# Patient Record
Sex: FEMALE | Race: WHITE | NOT HISPANIC OR LATINO | Employment: STUDENT | URBAN - NONMETROPOLITAN AREA
[De-identification: names, ages, dates, MRNs, and addresses within clinical notes are randomized per-mention and may not be internally consistent; named-entity substitution may affect disease eponyms.]

---
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
## 2018-01-04 NOTE — NURSING NOTE
Patient Information     Patient Name MRN Sex Mary Ann Huddleston [id number] Female 2000      Nursing Note by Sobia Patino at 2017  9:15 AM     Author:  Sobia Patino Service:  (none) Author Type:  (none)     Filed:  2017  9:38 AM Encounter Date:  2017 Status:  Signed     :  Sobia Patino            Patient presents with teeth/Gum concerns.  Sobia Patino LPN .........................2017  9:07 AM

## 2018-01-04 NOTE — PROGRESS NOTES
Patient Information     Patient Name MRN Sex Mary Ann Mosher [id number] Female 2000      Progress Notes by Ruba Roe MD at 2017  9:15 AM     Author:  Ruba Roe MD Service:  (none) Author Type:  Physician     Filed:  2017  5:27 PM Encounter Date:  2017 Status:  Signed     :  Ruba Roe MD (Physician)            Nursing Notes:   Sobia Patino  2017  9:38 AM  Signed  Patient presents with teeth/Gum concerns.  Sobia Patino LPN .........................2017  9:07 AM      HPI:  Mary Ann Spring is a 16 y.o. female who presents with mother for evaluation of some sore lumps in the soft palate that have been noted over the past 6 weeks. She reports the lumps seem to be in the soft tissue and has not had any sores or redness on the surface of the palate. She reports the right side tends to be larger but does change in size. Mary Ann had her wisdom teeth removed 3 years ago, no recent dental issues or pain. She has not had any cough or cold symptoms lately but did have mono in December this winter. She has been afebrile, slight congestion but no sneezing.        ROS:  see HPI. Otherwise negative.    No current outpatient prescriptions on file.     No current facility-administered medications for this visit.      Medications have been reviewed by me and are current to the best of my knowledge and ability.    Review of patient's allergies indicates no known allergies.    Past Medical History:     Diagnosis  Date     Hx of being hospitalized      15 months for RSV bronchiolitis.      UTI (lower urinary tract infection)        Family History       Problem   Relation Age of Onset     Other  Brother      Cytomegalovirus encephalopathy         Social History     Social History        Marital status:  Single     Spouse name: N/A     Number of children:  N/A     Years of education:  N/A     Social History Main Topics       Smoking status:  "Never Smoker     Smokeless tobacco: None     Alcohol use None     Drug use: None     Sexual activity: Not Asked     Other Topics  Concern     None      Social History Narrative     Lives with  parents and 2 siblings in [place].      Mother is an internal medicine nurse practitioner at [employer].  Youngest sibling,        Preload  11/22/2013         PE:  /80  Pulse 70  Temp 98.6  F (37  C) (Tympanic)  Ht 1.588 m (5' 2.5\")  Wt 64.4 kg (142 lb)  BMI 25.56 kg/m2  General appearance: Alert, well nourished, in no distress.  Ear Exam: Canals and TMs clear bilaterally.  Nose Exam: normal mucosa.  OroPharynx Exam: no erythema, edema, or exudates. No obvious sores or redness of soft palate, small symmetrical palpable lumps on the lateral aspect of the posterior palate in the soft tissue, slightly tender to palpation  Neck Exam: neck supple with no masses or adenopathy.  Cardiovascular Exam: RRR without mumurs, clicks or gallops.  Lung Exam:: Clear to auscultation, no wheezing, crackles or rhonchi.  No increased work of breathing.      Assessment:     ICD-10-CM    1. Pharynx or nasopharynx edema J39.2 triamcinolone, 55 mcg each actuation, nasal (NASACORT AQ) 55 mcg nasal spray         Plan:  Palpable lumps in the soft palate are symmetric and etiology is unclear. This may represent normal anatomy of either the superior aspect of the tonsil or floor of the maxillary sinus. We opted to try on some nasal steroid to see if this would improve symptoms if sinus related as symptoms did begin around the time of snow melt and may be allergen related. If still present or worsening in the next month or so then will need to discuss further imaging with CT and referral to ENT.     Ruba Roe MD ....................  4/18/2017   5:26 PM                     "

## 2018-01-26 VITALS
DIASTOLIC BLOOD PRESSURE: 80 MMHG | SYSTOLIC BLOOD PRESSURE: 110 MMHG | TEMPERATURE: 98.6 F | WEIGHT: 142 LBS | BODY MASS INDEX: 25.16 KG/M2 | HEART RATE: 70 BPM | HEIGHT: 63 IN

## 2018-01-26 VITALS — DIASTOLIC BLOOD PRESSURE: 64 MMHG | WEIGHT: 135 LBS | SYSTOLIC BLOOD PRESSURE: 107 MMHG | HEART RATE: 72 BPM

## 2018-02-01 ENCOUNTER — DOCUMENTATION ONLY (OUTPATIENT)
Dept: FAMILY MEDICINE | Facility: OTHER | Age: 18
End: 2018-02-01

## 2018-02-01 RX ORDER — TRIAMCINOLONE ACETONIDE 55 UG/1
1 SPRAY, METERED NASAL DAILY
COMMUNITY
Start: 2017-04-18 | End: 2018-07-16

## 2018-02-01 RX ORDER — CHLOROQUINE PHOSPHATE 500 MG/1
TABLET, COATED ORAL
COMMUNITY
Start: 2017-06-02 | End: 2018-07-16

## 2018-03-26 ENCOUNTER — HEALTH MAINTENANCE LETTER (OUTPATIENT)
Age: 18
End: 2018-03-26

## 2018-07-16 ENCOUNTER — OFFICE VISIT (OUTPATIENT)
Dept: FAMILY MEDICINE | Facility: OTHER | Age: 18
End: 2018-07-16
Attending: FAMILY MEDICINE
Payer: COMMERCIAL

## 2018-07-16 VITALS
SYSTOLIC BLOOD PRESSURE: 102 MMHG | BODY MASS INDEX: 25.94 KG/M2 | WEIGHT: 146.4 LBS | DIASTOLIC BLOOD PRESSURE: 70 MMHG | HEIGHT: 63 IN | HEART RATE: 60 BPM

## 2018-07-16 DIAGNOSIS — Z00.00 PHYSICAL EXAM, ANNUAL: Primary | ICD-10-CM

## 2018-07-16 DIAGNOSIS — Z30.011 ENCOUNTER FOR INITIAL PRESCRIPTION OF CONTRACEPTIVE PILLS: ICD-10-CM

## 2018-07-16 PROCEDURE — 99395 PREV VISIT EST AGE 18-39: CPT | Performed by: FAMILY MEDICINE

## 2018-07-16 RX ORDER — FEXOFENADINE HCL 180 MG/1
180 TABLET ORAL DAILY
Qty: 30 TABLET | Refills: 1 | COMMUNITY
Start: 2018-07-16

## 2018-07-16 ASSESSMENT — PAIN SCALES - GENERAL: PAINLEVEL: NO PAIN (0)

## 2018-07-16 NOTE — PROGRESS NOTES
SUBJECTIVE:    Mary Ann Spring is a 17 year old female who presents for her annual exam.    HPI: Mary Ann Spring is a 17 year old female presents for her annual exam.    Last pap: NA  Immunizations:  Up to date   Mammogram at age 43  Colon cancer screening at age 48  Current birth control none, she would like to discuss this today.  She is in a long-term relationship.  She has not ever been sexually active.          PROBLEM LIST:  There is no problem list on file for this patient.      PAST MEDICAL HISTORY:  Past Medical History:   Diagnosis Date     Infectious mononucleosis without complication     12/2016     Personal history of other medical treatment (CODE)     15 months for RSV bronchiolitis.     Urinary tract infection     02/07     SURGICAL HISTORY:  Past Surgical History:   Procedure Laterality Date     OTHER SURGICAL HISTORY      90528.0,PAST SURGICAL HISTORY,none       SOCIAL HISTORY:  Social History     Social History     Marital status: Single     Spouse name: N/A     Number of children: N/A     Years of education: N/A     Occupational History     Not on file.     Social History Main Topics     Smoking status: Never Smoker     Smokeless tobacco: Never Used     Alcohol use Not on file     Drug use: Not on file     Sexual activity: Not on file     Other Topics Concern     Not on file     Social History Narrative    Lives with  parents and 2 siblings in [place].    Mother is an internal medicine nurse practitioner at [employer].  Youngest sibling,  Will attend  [employer], Fall 2017, freshman     FAMILY HISTORY:    Family History   Problem Relation Age of Onset     Other - See Comments Brother      Cytomegalovirus encephalopathy       CURRENT MEDICATIONS:   Current Outpatient Prescriptions   Medication Sig Dispense Refill     fexofenadine (ALLEGRA) 180 MG tablet Take 1 tablet (180 mg) by mouth daily 30 tablet 1     ALLERGIES:   No Known Allergies      REVIEW OF SYSTEMS:  General: denies any  "general problems.  Eyes: denies problems; glasses for reading, has eye appointment coming up    Ears/Nose/Throat: denies problems, last dentist visit was last Friday   Respiratory: denies problems  Cardiovascular: denies problems  Gastrointestinal: denies problems  Genitourinary: LMP - June 28th, 28-36 day cycles, no significant pain/cramping  No previous birth control use.    Musculoskeletal: does long distance running - no significant running injuries  Skin: denies problems  Neurologic: denies problems - no headaches   Psychiatric: denies problems  Endocrine: denies problems  Heme/Lymphatic: denies problems  Allergic/Immunologic: deniesproblems    PHQ-2 Score:     PHQ-2 ( 1999 Pfizer) 7/16/2018   Q1: Little interest or pleasure in doing things 0   Q2: Feeling down, depressed or hopeless 0   PHQ-2 Score 0         OBJECTIVE:  /70 (BP Location: Right arm, Patient Position: Sitting, Cuff Size: Adult Regular)  Pulse 60  Ht 5' 3\" (1.6 m)  Wt 146 lb 6.4 oz (66.4 kg)  LMP 06/28/2018  Breastfeeding? No  BMI 25.93 kg/m2   EXAM:   General Appearance: Pleasant, alert, appropriate appearance for age. No acute distress  Head Exam: Normal. Normocephalic, atraumatic.  Eye Exam:Normal external eye, conjunctiva, lids, cornea. RODNEY.  Ear Exam: Normal TM's bilaterally. Normal auditory canals and external ears. Non-tender.  Nose Exam: Normal external nose, mucus membranes, and septum.  OroPharynx Exam:  Dental hygiene adequate. Normal buccal mucose. Normal pharynx.  Neck Exam:  Supple, no masses or nodes.  Thyroid Exam: No nodules or enlargement.  Chest/Respiratory Exam: Normal chest wall andrespirations. Clear to auscultation.  Cardiovascular Exam: Regular rate and rhythm. S1, S2, no murmur, click, gallop, or rubs.  Gastrointestinal Exam: Soft, non-tender, no masses or organomegaly  Musculoskeletal Exam: Back is straight and non-tender, full ROM of upper and lower extremities.  Foot Exam: Left andright foot: good pedal " pulses, no lesions, nail hygiene good.  Skin: no rash or abnormalities  Neurologic Exam: Nonfocal, symmetric DTRs, normal gross motor, tone coordination and no tremor.  Psychiatric Exam: Alertand oriented - appropriate affect.    Office Visit - GI on 12/12/2016   Component Date Value Ref Range Status     Heterophile - Historical 12/12/2016 Negative  Negative Final     AST (SGOT) - Historical 12/12/2016 35  13 - 39 IU/L Final     Sodium 12/12/2016 136  133 - 143 mmol/L Final     Urea Nitrogen 12/12/2016 9  7 - 25 mg/dL Final     Protein Total 12/12/2016 7.9  6.4 - 8.9 g/dL Final     Bilirubin Total 12/12/2016 0.4  0.3 - 1.0 mg/dL Final     A/G Ratio - Historical 12/12/2016 1.2  1.0 - 2.0 Final     Glucose 12/12/2016 89  70 - 105 mg/dL Final     Potassium 12/12/2016 4.4  3.5 - 5.1 mmol/L Final     BUN/Creatinine Ratio - Historical 12/12/2016 15   Final     GFR Estimate If Black 12/12/2016 >60  >60 ml/min/1.73m2 Final     Albumin 12/12/2016 4.3  3.5 - 5.7 g/dL Final     Chloride 12/12/2016 102  98 - 107 mmol/L Final     Carbon Dioxide 12/12/2016 24  21 - 31 mmol/L Final     Creatinine 12/12/2016 0.61* 0.70 - 1.30 mg/dL Final     GFR If Not  - Hist* 12/12/2016 >60  >60 ml/min/1.73m2 Final     Globulin - Historical 12/12/2016 3.6  2.0 - 3.7 g/dL Final     ALT (SGPT) - Historical 12/12/2016 43  7 - 52 IU/L Final     Anion Gap - Historical 12/12/2016 10  5 - 18 Final     Calcium 12/12/2016 8.9  8.6 - 10.3 mg/dL Final     Alkaline Phosphatase 12/12/2016 70  34 - 104 IU/L Final     EBV IgM - Historical 12/14/2016 Positive* Negative Final     EBNA IgG - Historical 12/14/2016 Positive* Negative Final     VCA IgG - Historical 12/14/2016 Positive* Negative Final     EBV Interpretation - Historical 12/14/2016 Suggests Convalescence   Final     White Blood Count - Historical 12/12/2016 7.6  4.5 - 13.0 thou/cu mm Final     BASO 12/12/2016 1.1  <3.0 % Final     Hematocrit 12/12/2016 37.3  33.0 - 51.0 % Final      Absolute Lymphocytes - Historical 12/12/2016 3.2  1.2 - 6.5 thou/cu mm Final     MCHC 12/12/2016 31.7* 32.0 - 36.0 g/dL Final     Platelet Count 12/12/2016 252  140 - 440 thou/cu mm Final     % Eosinophils 12/12/2016 1.3  <8.0 % Final     Absolute Monocytes - Historical 12/12/2016 0.6  <0.9 thou/cu mm Final     Absolute Eosinophils - Historical 12/12/2016 0.1  <0.5 thou/cu mm Final     Hemoglobin 12/12/2016 11.8* 12.0 - 16.0 g/dL Final     MCH 12/12/2016 26.1  25.0 - 35.0 pg Final     Absolute Basophils - Historical 12/12/2016 0.1  <0.3 thou/cu mm Final     MCV 12/12/2016 82  78 - 102 fL Final     % Neutrophils 12/12/2016 47.7  33.0 - 64.0 % Final     Red Blood Count - Historical 12/12/2016 4.52  4.10 - 5.10 mil/cu mm Final     RDW 12/12/2016 13.8  11.5 - 15.5 % Final     MPV 12/12/2016 6.9  6.5 - 11.0 fL Final     Absolute Neutrophils - Historical 12/12/2016 3.6  1.5 - 8.0 thou/cu mm Final     % Lymphocytes 12/12/2016 42.0  25.0 - 48.0 % Final     % Monocytes 12/12/2016 8.0  <12.0 % Final       ASSESSMENT/PLAN    ICD-10-CM    1. Physical exam, annual Z00.00    2. Encounter for initial prescription of contraceptive pills Z30.011 norgestrel-ethinyl estradiol (LO/OVRAL) 0.3-30 MG-MCG per tablet     1.  STD testing, other labs not indicated today.  2.  Immunizations are up-to-date.  3.  We discussed contraceptive options including oral contraceptives, Depo-Provera, patch, pill, IUD, Norplant.  Following this discussion, she wishes to proceed with oral contraceptives.  She would like to do standard rather than continuous dosing..  She does not have any contraindications to use.  Potential side effects including nausea.,  Headaches, breast tenderness, irregular bleeding are discussed.  We also discussed concerning side effects such as hypertension and V TE.  Prescription for lo-Ovral 1 tablet daily to start on her next period.        Hailey Abebe MD

## 2018-07-16 NOTE — MR AVS SNAPSHOT
"              After Visit Summary   7/16/2018    Mary Ann Spring    MRN: [id number]           Patient Information     Date Of Birth          2000        Visit Information        Provider Department      7/16/2018 3:00 PM Hailey Bridges MD [facility]        Today's Diagnoses     Physical exam, annual    -  1    Encounter for initial prescription of contraceptive pills          Care Instructions    Start you birth control pills the first day of your next period.            Follow-ups after your visit        Your next 10 appointments already scheduled     Jul 20, 2018 11:00 AM CDT   DEONNA Chiropractor with Debra Spann DC   [facility] ([facility])    [address] [place] [id number] [phone]              Who to contact     If you have questions or need follow up information about today's clinic visit or your schedule please contact [facility] directly at [phone].  Normal or non-critical lab and imaging results will be communicated to you by [employer], letter or phone within 4 business days after the clinic has received the results. If you do not hear from us within 7 days, please contact the clinic through [employer] or phone. If you have a critical or abnormal lab result, we will notify you by phone as soon as possible.  Submit refill requests through [employer] or call your pharmacy and they will forward the refill request to us. Please allow 3 business days for your refill to be completed.          Additional Information About Your Visit        [employer] Information     [employer] lets you send messages to your doctor, view your test results, renew your prescriptions, schedule appointments and more. To sign up, go to [email or web address] . Click on \"Log in\" on the left side of the screen, which will take you to the Welcome page. Then click on \"Sign up Now\" on the right side of the page. " "    You will be asked to enter the access code listed below, as well as some personal information. Please follow the directions to create your username and password.     Your access code is: [email or web address]  Expires: 10/16/2018  5:22 PM     Your access code will  in 90 days. If you need help or a new code, please call your [facility] clinic or [phone].        Care EveryWhere ID     This is your Care EveryWhere ID. This could be used by other organizations to access your [facility] medical records  [id number]        Your Vitals Were     Pulse Height Last Period Breastfeeding? BMI (Body Mass Index)       60 5' 3\" (1.6 m) 2018 No 25.93 kg/m2        Blood Pressure from Last 3 Encounters:   18 102/70   17 107/64   17 110/80    Weight from Last 3 Encounters:   18 146 lb 6.4 oz (66.4 kg) (77 %)*   17 135 lb (61.2 kg) (67 %)*   17 142 lb (64.4 kg) (76 %)*     * Growth percentiles are based on [facility] 2-20 Years data.              Today, you had the following     No orders found for display         Today's Medication Changes          These changes are accurate as of 18 11:59 PM.  If you have any questions, ask your nurse or doctor.               Start taking these medicines.        Dose/Directions    norgestrel-ethinyl estradiol 0.3-30 MG-MCG per tablet   Commonly known as:  LO/OVRAL   Used for:  Encounter for initial prescription of contraceptive pills   Started by:  Hailey Bridges MD        Dose:  1 tablet   Take 1 tablet by mouth daily   Quantity:  84 tablet   Refills:  3         Stop taking these medicines if you haven't already. Please contact your care team if you have questions.     chloroquine 500 MG tablet   Commonly known as:  ARALEN   Stopped by:  Hailey Bridges MD           triamcinolone 55 MCG/ACT Inhaler   Commonly known as:  NASACORT   Stopped by:  Hailey Bridges MD                Where to get your medicines      These " medications were sent to [facility], - [place], [place] - [address]  [address], [place] [address]     Phone:  [phone]     norgestrel-ethinyl estradiol 0.3-30 MG-MCG per tablet                Primary Care Provider Office Phone # Fax #    Hailey SERRANO Pola Sam [phone]       [address]  [place] [address]        Equal Access to Services     [facility] Hospital[facility]IGEL : Hadii aad ku hadasho Soomaali, waaxda luqadaha, qaybta kaalmada adeegyada, waxay laureano hayyessica barnes . So [facility] [phone].    ATENCIÓN: Si habla español, tiene a holland disposición servicios gratuitos de asistencia lingüística. LlUniversity [facility] [phone].    We comply with applicable federal civil rights laws and [place] laws. We do not discriminate on the basis of race, color, national origin, age, disability, sex, sexual orientation, or gender identity.            Thank you!     Thank you for choosing [facility] AND [facility]  for your care. Our goal is always to provide you with excellent care. Hearing back from our patients is one way we can continue to improve our services. Please take a few minutes to complete the written survey that you may receive in the mail after your visit with us. Thank you!             Your Updated Medication List - Protect others around you: Learn how to safely use, store and throw away your medicines at www.disposemymeds.org.          This list is accurate as of 7/16/18 11:59 PM.  Always use your most recent med list.                   Brand Name Dispense Instructions for use Diagnosis    fexofenadine 180 MG tablet    ALLEGRA    30 tablet    Take 1 tablet (180 mg) by mouth daily        norgestrel-ethinyl estradiol 0.3-30 MG-MCG per tablet    LO/OVRAL    84 tablet    Take 1 tablet by mouth daily    Encounter for initial prescription of contraceptive pills

## 2018-07-16 NOTE — NURSING NOTE
Patient presents to the clinic today to establish care with Hailey Abebe MD.     Mela Hillman LPN.................. 7/16/2018 3:00 PM

## 2018-07-20 ENCOUNTER — THERAPY VISIT (OUTPATIENT)
Dept: CHIROPRACTIC MEDICINE | Facility: OTHER | Age: 18
End: 2018-07-20
Attending: CHIROPRACTOR
Payer: COMMERCIAL

## 2018-07-20 DIAGNOSIS — M99.02 SEGMENTAL AND SOMATIC DYSFUNCTION OF THORACIC REGION: ICD-10-CM

## 2018-07-20 DIAGNOSIS — G89.29 CHRONIC LEFT-SIDED THORACIC BACK PAIN: Primary | ICD-10-CM

## 2018-07-20 DIAGNOSIS — M54.2 CERVICALGIA: ICD-10-CM

## 2018-07-20 DIAGNOSIS — M99.01 SEGMENTAL AND SOMATIC DYSFUNCTION OF CERVICAL REGION: ICD-10-CM

## 2018-07-20 DIAGNOSIS — M54.6 CHRONIC LEFT-SIDED THORACIC BACK PAIN: Primary | ICD-10-CM

## 2018-07-20 PROCEDURE — 98940 CHIROPRACT MANJ 1-2 REGIONS: CPT | Performed by: CHIROPRACTOR

## 2018-07-20 NOTE — PROGRESS NOTES
"PATIENT:  Mary Ann Spring is a 17 year old female presenting for neck and L upper back pain    PROBLEM:   Date of Initial Visit for this Episode:  7/20/2018 7/20/2018  Visit #1/6-8    SUBJECTIVE / HPI:   Description and onset: L upper back and lower neck pain.   She is a long-distance runner and for the past 5 years has had burning left upper back pain after running 5 miles.  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  R handed.     Duration and Frequency of Pain: frequency  Radiation of pain: no  Pain rated at it's worst: 5/10  Pain rated currently:  0/10  Pain course: Not changing  Worse with:  Running,  Working as CNA lifting and transferring patients.    Improved by: Self \"cracking\" neck and back with a spinal twist.  Additional Features: Denies extremity radiation, weakness, numbness or tingling.  Other Health Care Providers seen for this: none  Previous treatment: yoga, stretching.    Previous injury: none      See flowsheets in chart for details.  7/20/2018  Neck index 4 %   concentration 1 out of 5     Functional limitations:  Distracting from running and work    Exercise habits: runs long distance  Sleeping habits:  Occasionally limits some    Past D.C. Care: no     Health History as reported by the patient: Excellent      PAST MEDICAL HISTORY:  Past Medical History:   Diagnosis Date     Encounter for initial prescription of contraceptive pills 7/16/2018     Infectious mononucleosis without complication     12/2016     Personal history of other medical treatment (CODE)     15 months for RSV bronchiolitis.     Urinary tract infection     02/07       PAST SURGICAL HISTORY:  Past Surgical History:   Procedure Laterality Date     OTHER SURGICAL HISTORY      52113.0,PAST SURGICAL HISTORY,none "       ALLERGIES:  No Known Allergies    CURRENT MEDICATIONS:  Current Outpatient Prescriptions   Medication Sig Dispense Refill     fexofenadine (ALLEGRA) 180 MG tablet Take 1 tablet (180 mg) by mouth daily 30 tablet 1     norgestrel-ethinyl estradiol (LO/OVRAL) 0.3-30 MG-MCG per tablet Take 1 tablet by mouth daily 84 tablet 3       SOCIAL HISTORY:  Marital Status: single (never ).  Children: no.  Occupation: College student/CNA in summer    FAMILY HISTORY:  Family History   Problem Relation Age of Onset     Other - See Comments Brother      Cytomegalovirus encephalopathy     ROS:  The patient denies any shortness of breath, chest pain.    OBJECTIVE:    DIAGNOSTICS: Has not had current spinal imaging taken.     PHYSICAL EXAM:     GENERAL APPEARANCE: healthy, alert and no distress   GAIT: NORMAL grossly, mild right leg internal rotation  SKIN: no suspicious lesions or rashes  NEURO: cranial nerves 2-12 intact, normal gait  PSYCH:  mentation appears normal and affect normal/bright    MUSCULOSKELETAL:   Posture: Anterior head carriage, rounded shoulders.  Low left iliac crest, Low left shoulder, left rotation occiput.    Gait:  unremarkable.     Cervical  ROM:   smooth motion   45/50 flexion    45/45 extension    30/45 RLF 45/45 LLF    80/85 RR         70/85 LR       -Maximal Foraminal Compression   Shoulder Depression: + Focal left neck.  Distraction improves      Thoracic and Lumbar  ROM: 60/60 flexion 60/60 extension    45/45 RLF    35/45 LLF   45/45 RR      35/45 LR    +Kemps: + Low back pain to left; negative side to right  +Leg length inequality: Mild -Derefield R; Restricted R heel to buttock     +Tenderness: Left occiput to C1 right C5, T5, T7  +Muscle spasm: Moderate suboccipitals, right greater than left trapezius, left greater than right levator scapula, left greater than right rhomboids, thoracic, mild lumbar paraspinals.  +Joint asymmetry and restriction: Occ L, C5 R, T5 L, T7 L      ASSESSMENT:  Mary Ann Spring is a 17 year old female      1. Chronic left-sided thoracic back pain    2. Segmental and somatic dysfunction of thoracic region    3. Cervicalgia    4. Segmental and somatic dysfunction of cervical region        PLAN    Evaluation and Management:  14594 Low to moderate level exam 20 min    Procedures:  Modalities:  None performed this visit    CMT:  49704 Chiropractic manipulative treatment 1-2 regions performed   Cervical: Diversified, Occiput, C5 , Supine  Thoracic: Diversified, T5, T7, Prone    Therapeutic procedures:  None  49703: Therapeutic Exercises  Direct one-on-one treatment to develop flexibilty, range of motion, strength and endurance.   The following were demonstrated and practiced:   Stretches - instructions for home exercise program as well as in-office session of 15 minutes of the stretching was done today.    Cat and cow (lengthen spine, pull shoulder blades down back), Seated neck flexion with rotation (trapezius m.)    Response to Treatment  Reduction in symptoms less tension    Prognosis: Excellent    7/20/2018 Plan of Care:  6-8 visits of Chiropractic Care including Spinal Adjustments and/or physiotherapy and active rehabilitation, to include exercises in the office and/or at home to meet care plan goals.     Frequency: 2xweek for up to 4 weeks. A reevaluation would be clinically appropriate in 6-8 visits, to determine progress and further course of care.  POC discussed and patient agreeable to plan of care.      7/20/2018 Goals:      Patient will report improved pain.   Patient will report able to run long distance without burning pain.   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   Patient will demonstrate an improved ability to complete Activities of Daily Living as shown by a reported 10-30% reduced score on neck and/or back index.    Patient will demonstrate improved ROM.        INSTRUCTIONS   stretch as instructed at visit  Cat  and cow (lengthen spine, pull shoulder blades down back), Seated neck flexion with rotation (trapezius m.)    Follow-up:  Return to care in 3-4 days.        Disclaimer: This note consists of symbols derived from keyboarding, dictation and/or voice recognition software. As a result, there may be errors in the script that have gone undetected. Please consider this when interpreting information found in this chart.

## 2018-07-20 NOTE — PATIENT INSTRUCTIONS
7/20/2018 Plan of Care:  6-8 visits of Chiropractic Care including Spinal Adjustments and/or physiotherapy and active rehabilitation, to include exercises in the office and/or at home to meet care plan goals.     Frequency: 2xweek for up to 4 weeks. A reevaluation would be clinically appropriate in 6-8 visits, to determine progress and further course of care.  POC discussed and patient agreeable to plan of care.      7/20/2018 Goals:      Patient will report improved pain.   Patient will report able to run long distance without burning pain.   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   Patient will demonstrate an improved ability to complete Activities of Daily Living as shown by a reported 10-30% reduced score on neck and/or back index.    Patient will demonstrate improved ROM.        INSTRUCTIONS   stretch as instructed at visit  Cat and cow (lengthen spine, pull shoulder blades down back), Seated neck flexion with rotation (trapezius m.)    Follow-up:  Return to care in 3-4 days.

## 2018-07-20 NOTE — MR AVS SNAPSHOT
After Visit Summary   7/20/2018    Mary Ann Spring    MRN: [id number]           Patient Information     Date Of Birth          2000        Visit Information        Provider Department      7/20/2018 11:00 AM Debra Spann DC [facility]        Today's Diagnoses     Chronic left-sided thoracic back pain    -  1    Segmental and somatic dysfunction of thoracic region        Cervicalgia        Segmental and somatic dysfunction of cervical region          Care Instructions      7/20/2018 Plan of Care:  6-8 visits of Chiropractic Care including Spinal Adjustments and/or physiotherapy and active rehabilitation, to include exercises in the office and/or at home to meet care plan goals.     Frequency: 2xweek for up to 4 weeks. A reevaluation would be clinically appropriate in 6-8 visits, to determine progress and further course of care.  POC discussed and patient agreeable to plan of care.      7/20/2018 Goals:      Patient will report improved pain.   Patient will report able to run long distance without burning pain.   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   Patient will demonstrate an improved ability to complete Activities of Daily Living as shown by a reported 10-30% reduced score on neck and/or back index.    Patient will demonstrate improved ROM.        INSTRUCTIONS   stretch as instructed at visit  Cat and cow (lengthen spine, pull shoulder blades down back), Seated neck flexion with rotation (trapezius m.)    Follow-up:  Return to care in 3-4 days.                  Follow-ups after your visit        Your next 10 appointments already scheduled     Jul 24, 2018  4:00 PM CDT   DEONNA Chiropractor with Debra Spann DC   [facility]    [address] [place] [address]   [phone]            Jul 30, 2018  9:00 AM B[facility]   DEONNA Chiropractor with Debra LOVING  "REHAN Spann   [facility] ([facility])    [address] [facility] [address]   [phone]            Aug 03, 2018  3:00 PM CDT   DEONNA Chiropractor with Debra Spann DC   [facility]    [address] [facility] [address]   [phone]              Who to contact     If you have questions or need follow up information about today's clinic visit or your schedule please contact General acute hospital directly at [phone].  Normal or non-critical lab and imaging results will be communicated to you by Herziohart, letter or phone within 4 business days after the clinic has received the results. If you do not hear from us within 7 days, please contact the clinic through Herziohart or phone. If you have a critical or abnormal lab result, we will notify you by phone as soon as possible.  Submit refill requests through Exerscrip or call your pharmacy and they will forward the refill request to us. Please allow 3 business days for your refill to be completed.          Additional Information About Your Visit        Exerscrip Information     Exerscrip lets you send messages to your doctor, view your test results, renew your prescriptions, schedule appointments and more. To sign up, go to [email or web address] . Click on \"Log in\" on the left side of the screen, which will take you to the Welcome page. Then click on \"Sign up Now\" on the right side of the page.     You will be asked to enter the access code listed below, as well as some personal information. Please follow the directions to create your username and password.     Your access code is: [email or web address]  Expires: 10/16/2018  5:22 PM     Your access code will  in 90 days. If you need help or a new code, please call your [place] clinic or [phone].        Care EveryWhere ID     This is your Care EveryWhere ID. This could be used by " other organizations to access your [facility] medical records  [id number]        Your Vitals Were     Last Period                   06/28/2018            Blood Pressure from Last 3 Encounters:   07/16/18 102/70   06/02/17 107/64   04/18/17 110/80    Weight from Last 3 Encounters:   07/16/18 66.4 kg (146 lb 6.4 oz) (77 %)*   06/02/17 61.2 kg (135 lb) (67 %)*   04/18/17 64.4 kg (142 lb) (76 %)*     * Growth percentiles are based on [facility] 2-20 Years data.              We Performed the Following     C CHIROPRAC MANIP,SPINAL,1-2 REGIONS - [facility] ONLY        Primary Care Provider Office Phone # Fax #    Hailey SERRANO Pola Sam [phone] [address] [facility] [address]        Equal Access to Services     [facility]: Hadii aad ku hadasho Soomaali, waaxda luqadaha, qaybta kaalmada adeegyada, brady barnes . So [facility] [phone].    ATENCIÓN: Si habla español, tiene a holland disposición servicios gratuitos de asistencia lingüística. Llame al [phone].    We comply with applicable federal civil rights laws and [place] laws. We do not discriminate on the basis of race, color, national origin, age, disability, sex, sexual orientation, or gender identity.            Thank you!     Thank you for choosing [facility]  for your care. Our goal is always to provide you with excellent care. Hearing back from our patients is one way we can continue to improve our services. Please take a few minutes to complete the written survey that you may receive in the mail after your visit with us. Thank you!             Your Updated Medication List - Protect others around you: Learn how to safely use, store and throw away your medicines at www.disposemymeds.org.          This list is accurate as of 7/20/18 12:34 PM.  Always use your most recent med list.                   Brand Name Dispense Instructions for use Diagnosis    fexofenadine 180 MG tablet     ALLEGRA    30 tablet    Take 1 tablet (180 mg) by mouth daily        norgestrel-ethinyl estradiol 0.3-30 MG-MCG per tablet    LO/OVRAL    84 tablet    Take 1 tablet by mouth daily    Encounter for initial prescription of contraceptive pills

## 2018-07-24 ENCOUNTER — THERAPY VISIT (OUTPATIENT)
Dept: CHIROPRACTIC MEDICINE | Facility: OTHER | Age: 18
End: 2018-07-24
Attending: FAMILY MEDICINE
Payer: COMMERCIAL

## 2018-07-24 DIAGNOSIS — M54.2 CERVICALGIA: ICD-10-CM

## 2018-07-24 DIAGNOSIS — M99.01 SEGMENTAL AND SOMATIC DYSFUNCTION OF CERVICAL REGION: ICD-10-CM

## 2018-07-24 DIAGNOSIS — M99.02 SEGMENTAL AND SOMATIC DYSFUNCTION OF THORACIC REGION: ICD-10-CM

## 2018-07-24 DIAGNOSIS — G89.29 CHRONIC LEFT-SIDED THORACIC BACK PAIN: Primary | ICD-10-CM

## 2018-07-24 DIAGNOSIS — M54.6 CHRONIC LEFT-SIDED THORACIC BACK PAIN: Primary | ICD-10-CM

## 2018-07-24 PROCEDURE — 98940 CHIROPRACT MANJ 1-2 REGIONS: CPT | Performed by: CHIROPRACTOR

## 2018-07-24 NOTE — PATIENT INSTRUCTIONS
INSTRUCTIONS   Continue POC  stretch as instructed at visit  Cat and cow (lengthen spine, pull shoulder blades down back), Seated neck flexion with rotation (trapezius m.)    Follow-up: scheduled 7/30.

## 2018-07-24 NOTE — PROGRESS NOTES
"PATIENT:  Mary Ann Spring is a 17 year old female presenting for neck and L upper back pain    PROBLEM:   Date of Initial Visit for this Episode:  7/20/2018 7/24/2018   Visit #2/6-8    SUBJECTIVE:    Very excited she was able to run 11 miles since last visit without any left upper back pain.  After run her R shoulder tightened, but not painful.   Better once stretched out that night.  Doing stretches once or more daily and finds helpful.  Training to run half marathon Aug. 25.      HPI:   Description and onset: L upper back and lower neck pain.   She is a long-distance runner and for the past 5 years has had burning left upper back pain after running 5 miles.  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  R handed.     Duration and Frequency of Pain: frequency  Radiation of pain: no  Pain rated at it's worst: 5/10  Pain rated currently:  0/10  Pain course: Not changing  Worse with:  Running,  Working as CNA lifting and transferring patients.    Improved by: Self \"cracking\" neck and back with a spinal twist.  Additional Features: Denies extremity radiation, weakness, numbness or tingling.  Other Health Care Providers seen for this: none  Previous treatment: yoga, stretching.    Previous injury: none      See flowsheets in chart for details.  7/20/2018  Neck index 4 %   concentration 1 out of 5     Functional limitations:  Distracting from running and work    Exercise habits: runs long distance  Sleeping habits:  Occasionally limits some    Past D.C. Care: no     Health History as reported by the patient: Excellent      PAST MEDICAL HISTORY:    CURRENT MEDICATIONS:    SOCIAL HISTORY:  Marital Status: single (never ).  Children: no.  Occupation: College student/CNA in summer    OBJECTIVE:    DIAGNOSTICS: Has " "not had current spinal imaging taken.   INITIAL 7/20/18 MUSCULOSKELETAL:   Posture: Anterior head carriage, rounded shoulders.  Low left iliac crest, Low left shoulder, left rotation occiput.    Gait:  unremarkable.     Cervical  ROM:   smooth motion   45/50 flexion    45/45 extension    30/45 RLF 45/45 LLF    80/85 RR         70/85 LR       -Maximal Foraminal Compression   Shoulder Depression: + Focal left neck.  Distraction improves      Thoracic and Lumbar  ROM: 60/60 flexion 60/60 extension    45/45 RLF    35/45 LLF   45/45 RR      35/45 LR    +Kemps: + Low back pain to left; negative side to right  +Leg length inequality: Mild -Derefield R; Restricted R heel to buttock     7/24/2018   +Tenderness: Left occiput to C1 right C5, L upper back  +Muscle spasm: Moderate suboccipitals, right greater than left trapezius, left greater than right levator scapula, left greater than right rhomboids, thoracic, +Joint asymmetry and restriction: Occ L, C5 R, T3 L, T7 L      ASSESSMENT: improved running     1. Chronic left-sided thoracic back pain    2. Segmental and somatic dysfunction of thoracic region    3. Cervicalgia    4. Segmental and somatic dysfunction of cervical region        PLAN    Procedures:  Modalities:  None performed this visit    CMT:  52672 Chiropractic manipulative treatment 1-2 regions performed   Cervical: Diversified, Occiput, C5 , Supine  Thoracic: Diversified, T3, T7, Anterior dorsal    Therapeutic procedures:  No review needed Therapeutic Exercises    Response to Treatment  \"Feels so good\".   Prognosis: Excellent    7/20/2018 Plan of Care:  6-8 visits of Chiropractic Care including Spinal Adjustments and/or physiotherapy and active rehabilitation, to include exercises in the office and/or at home to meet care plan goals.     Frequency: 2xweek for up to 4 weeks. A reevaluation would be clinically appropriate in 6-8 visits, to determine progress and further course of care.  POC discussed and patient " agreeable to plan of care.      7/20/2018 Goals:      Patient will report improved pain.   Patient will report able to run long distance without burning pain.   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   Patient will demonstrate an improved ability to complete Activities of Daily Living as shown by a reported 10-30% reduced score on neck and/or back index.    Patient will demonstrate improved ROM.        INSTRUCTIONS   Continue POC  stretch as instructed at visit  Cat and cow (lengthen spine, pull shoulder blades down back), Seated neck flexion with rotation (trapezius m.)    Follow-up: scheduled 7/30.         Disclaimer: This note consists of symbols derived from keyboarding, dictation and/or voice recognition software. As a result, there may be errors in the script that have gone undetected. Please consider this when interpreting information found in this chart.

## 2018-07-24 NOTE — MR AVS SNAPSHOT
After Visit Summary   7/24/2018    Mary Ann Spring    MRN: [id number]           Patient Information     Date Of Birth          2000        Visit Information        Provider Department      7/24/2018 1:00 PM Debra Spann DC [facility]        Today's Diagnoses     Chronic left-sided thoracic back pain    -  1    Segmental and somatic dysfunction of thoracic region        Cervicalgia        Segmental and somatic dysfunction of cervical region          Care Instructions      INSTRUCTIONS   Continue POC  stretch as instructed at visit  Cat and cow (lengthen spine, pull shoulder blades down back), Seated neck flexion with rotation (trapezius m.)    Follow-up: scheduled 7/30.            Follow-ups after your visit        Your next 10 appointments already scheduled     Jul 30, 2018  9:00 AM CDT   DEONNA Chiropractor with Debra Spann DC   [facility] ([facility])    [address] [place] [address]   [phone]            Aug 03, 2018  3:00 PM CDT   DEONNA Chiropractor with Debra Spann DC   [facility] ([facility])    [address] [place] [address]   [phone]              Who to contact     If you have questions or need follow up information about today's clinic visit or your schedule please contact [facility] directly at [phone].  Normal or non-critical lab and imaging results will be communicated to you by MyChart, letter or phone within 4 business days after the clinic has received the results. If you do not hear from us within 7 days, please contact the clinic through MyChart or phone. If you have a critical or abnormal lab result, we will notify you by phone as soon as possible.  Submit refill requests through UserEvents or call your pharmacy and they will forward the refill request to us. Please allow 3 business days  "for your refill to be completed.          Additional Information About Your Visit        Redfinhart Information     Structure Vision lets you send messages to your doctor, view your test results, renew your prescriptions, schedule appointments and more. To sign up, go to [email or web address] . Click on \"Log in\" on the left side of the screen, which will take you to the Welcome page. Then click on \"Sign up Now\" on the right side of the page.     You will be asked to enter the access code listed below, as well as some personal information. Please follow the directions to create your username and password.     Your access code is: [email or web address]  Expires: 10/16/2018  5:22 PM     Your access code will  in 90 days. If you need help or a new code, please call your [place] clinic or [phone].        Care EveryWhere ID     This is your Care EveryWhere ID. This could be used by other organizations to access your [facility] medical records  [id number]        Your Vitals Were     Last Period                   2018            Blood Pressure from Last 3 Encounters:   18 102/70   17 107/64   17 110/80    Weight from Last 3 Encounters:   18 66.4 kg (146 lb 6.4 oz) (77 %)*   17 61.2 kg (135 lb) (67 %)*   17 64.4 kg (142 lb) (76 %)*     * Growth percentiles are based on [facility] 2-20 Years data.              We Performed the Following     C CHIROPRAC MANIP,SPINAL,1-2 REGIONS - GI ONLY        Primary Care Provider Office Phone # Fax #    Hailey Sam [phone] [address] [facility] [address]        Equal Access to Services     FRANCESCO RODRIGUEZ : Santiago Snow, dio dempsey, brady ramos. [facility] [phone].    ATENCIÓN: Si habla español, tiene a holland disposición servicios gratuitos de asistencia lingüística. Llmarco a al [phone].    We comply with applicable federal civil " rights laws and [place] laws. We do not discriminate on the basis of race, color, national origin, age, disability, sex, sexual orientation, or gender identity.            Thank you!     Thank you for choosing [facility]  for your care. Our goal is always to provide you with excellent care. Hearing back from our patients is one way we can continue to improve our services. Please take a few minutes to complete the written survey that you may receive in the mail after your visit with us. Thank you!             Your Updated Medication List - Protect others around you: Learn how to safely use, store and throw away your medicines at www.disposemymeds.org.          This list is accurate as of 7/24/18  1:28 PM.  Always use your most recent med list.                   Brand Name Dispense Instructions for use Diagnosis    fexofenadine 180 MG tablet    ALLEGRA    30 tablet    Take 1 tablet (180 mg) by mouth daily        norgestrel-ethinyl estradiol 0.3-30 MG-MCG per tablet    LO/OVRAL    84 tablet    Take 1 tablet by mouth daily    Encounter for initial prescription of contraceptive pills

## 2018-08-01 ENCOUNTER — THERAPY VISIT (OUTPATIENT)
Dept: CHIROPRACTIC MEDICINE | Facility: OTHER | Age: 18
End: 2018-08-01
Attending: CHIROPRACTOR
Payer: COMMERCIAL

## 2018-08-01 DIAGNOSIS — M54.2 CERVICALGIA: ICD-10-CM

## 2018-08-01 DIAGNOSIS — G89.29 CHRONIC LEFT-SIDED THORACIC BACK PAIN: Primary | ICD-10-CM

## 2018-08-01 DIAGNOSIS — M54.6 CHRONIC LEFT-SIDED THORACIC BACK PAIN: Primary | ICD-10-CM

## 2018-08-01 DIAGNOSIS — M99.02 SEGMENTAL AND SOMATIC DYSFUNCTION OF THORACIC REGION: ICD-10-CM

## 2018-08-01 DIAGNOSIS — M99.01 SEGMENTAL AND SOMATIC DYSFUNCTION OF CERVICAL REGION: ICD-10-CM

## 2018-08-01 PROCEDURE — 98940 CHIROPRACT MANJ 1-2 REGIONS: CPT | Mod: AT | Performed by: CHIROPRACTOR

## 2018-08-01 NOTE — MR AVS SNAPSHOT
"              After Visit Summary   8/1/2018    Mary Ann Spring    MRN: [id number]           Patient Information     Date Of Birth          2000        Visit Information        Provider Department      8/1/2018 3:30 PM Maldonado Hutton DC [facility]        Today's Diagnoses     Chronic left-sided thoracic back pain    -  1    Segmental and somatic dysfunction of thoracic region        Cervicalgia        Segmental and somatic dysfunction of cervical region          Care Instructions    Instructions:Patient was encouraged to drink water, and utilize tennis ball for self myofascial release of upper trapezius on right side    Follow-up:  Return to care Monday.           Follow-ups after your visit        Follow-up notes from your care team     Return in about 5 days (around 8/6/2018) for Routine Visit.      Who to contact     If you have questions or need follow up information about today's clinic visit or your schedule please contact [facility] [employer] directly at [phone].  Normal or non-critical lab and imaging results will be communicated to you by Mitokynehart, letter or phone within 4 business days after the clinic has received the results. If you do not hear from us within 7 days, please contact the clinic through Mitokynehart or phone. If you have a critical or abnormal lab result, we will notify you by phone as soon as possible.  Submit refill requests through [employer] or call your pharmacy and they will forward the refill request to us. Please allow 3 business days for your refill to be completed.          Additional Information About Your Visit        [employer] Information     [employer] lets you send messages to your doctor, view your test results, renew your prescriptions, schedule appointments and more. To sign up, go to [email or web address] . Click on \"Log in\" on the left side of the screen, which will take you to the Welcome page. Then click on \"Sign up Now\" on the " right side of the page.     You will be asked to enter the access code listed below, as well as some personal information. Please follow the directions to create your username and password.     Your access code is: [email or web address]  Expires: 10/16/2018  5:22 PM     Your access code will  in 90 days. If you need help or a new code, please call your [facility] clinic or [phone].        Care EveryWhere ID     This is your Care EveryWhere ID. This could be used by other organizations to access your [facility] medical records  [id number]         Blood Pressure from Last 3 Encounters:   18 102/70   17 107/64   17 110/80    Weight from Last 3 Encounters:   18 66.4 kg (146 lb 6.4 oz) (77 %)*   17 61.2 kg (135 lb) (67 %)*   17 64.4 kg (142 lb) (76 %)*     * Growth percentiles are based on [facility] 2-20 Years data.              We Performed the Following     CHIROPRAC MANIP,SPINAL,1-2 REGIONS        Primary Care Provider Office Phone # Fax #    Hailey SERRANO Pola Sam [phone] [address] [facility] [address]        Equal Access to Services     FRANCESCO RODRIGUEZ : Hadii aad ku hadasho Soomaali, waaxda luqadaha, qaybta kaalmada adeegyada, brady barnes . So [facility] [phone].    ATENCIÓN: Si habla español, tiene a holland disposición servicios gratuitos de asistencia lingüística. Llame al [phone].    We comply with applicable federal civil rights laws and [place] laws. We do not discriminate on the basis of race, color, national origin, age, disability, sex, sexual orientation, or gender identity.            Thank you!     Thank you for choosing [facility]  for your care. Our goal is always to provide you with excellent care. Hearing back from our patients is one way we can continue to improve our services. Please take a few minutes to complete the written survey that you may receive in the mail after  your visit with us. Thank you!             Your Updated Medication List - Protect others around you: Learn how to safely use, store and throw away your medicines at www.disposemymeds.org.          This list is accurate as of 8/1/18  3:55 PM.  Always use your most recent med list.                   Brand Name Dispense Instructions for use Diagnosis    fexofenadine 180 MG tablet    ALLEGRA    30 tablet    Take 1 tablet (180 mg) by mouth daily        norgestrel-ethinyl estradiol 0.3-30 MG-MCG per tablet    LO/OVRAL    84 tablet    Take 1 tablet by mouth daily    Encounter for initial prescription of contraceptive pills

## 2018-08-01 NOTE — PROGRESS NOTES
Visit #:  3    Subjective:  Mary Ann Spring is a 17 year old female who is seen in f/u up for:        Chronic left-sided thoracic back pain  Segmental and somatic dysfunction of thoracic region  Cervicalgia  Segmental and somatic dysfunction of cervical region.     Since last visit on 7/24/2018,  Mary Ann Spring reports:    Area of chief complaint:  Cervical and Thoracic :  Symptoms are graded at 0/10.  Patient reports experiencing spasm\charley horse sensation along the upper trapezium last night.  Patient reports pain levels were ranked about 5 out of 10, the patient reports pain has subsided since last night.  Patient reports pain is noticeable upon palpation of upper trapezium.  Patient notes improvement with course of care.     Objective:  The following was observed:    P: Mild tenderness noted over the upper trapezium bilaterally and over the suboccipitals on the right side  A: static palpation demonstrates intersegmental asymmetry , cervical, thoracic  R: motion palpation notes restricted motion, :  C1 Right lateral flexion restricted and Extension restriction  T3 Extension restriction  T: hypertonicity at: Upper trapezium bilaterally, suboccipitals right side.  Hypertonicity is of mild/moderate levels:      Segmental spinal dysfunction/restrictions found at:  :  C1 Right lateral flexion restricted and Extension restriction  T3 Extension restriction        Assessment:    Diagnoses:      1. Chronic left-sided thoracic back pain    2. Segmental and somatic dysfunction of thoracic region    3. Cervicalgia    4. Segmental and somatic dysfunction of cervical region        Patient's condition:  Patient had restrictions pre-manipulation and increased hypertonicity of cervical thoracic junction musculature.    Treatment effectiveness:  Post manipulation there is better intersegmental movement      Procedures:  CMT:  57881 Chiropractic manipulative treatment 1-2 regions performed   Cervical: Diversified,  C1 , Supine  Thoracic: Diversified, T3, Prone    Modalities:  None performed this visit    Therapeutic procedures:  None    Response to Treatment  Increased mobility of intersegmental restriction.    Prognosis: Excellent    Progress towards Goals: Patient is making progress towards the goal.     Recommendations:    Instructions:Patient was encouraged to drink water, and utilize tennis ball for self myofascial release of upper trapezius on right side    Follow-up:  Return to care Monday.

## 2018-08-01 NOTE — PATIENT INSTRUCTIONS
Instructions:Patient was encouraged to drink water, and utilize tennis ball for self myofascial release of upper trapezius on right side    Follow-up:  Return to care Monday.

## 2019-02-11 ENCOUNTER — TELEPHONE (OUTPATIENT)
Dept: FAMILY MEDICINE | Facility: OTHER | Age: 19
End: 2019-02-11

## 2019-02-11 DIAGNOSIS — J10.1 INFLUENZA A: Primary | ICD-10-CM

## 2019-02-11 RX ORDER — OSELTAMIVIR PHOSPHATE 75 MG/1
75 CAPSULE ORAL 2 TIMES DAILY
Qty: 10 CAPSULE | Refills: 0 | Status: SHIPPED | OUTPATIENT
Start: 2019-02-11 | End: 2019-02-16

## 2019-02-11 NOTE — TELEPHONE ENCOUNTER
Mary Ann was around sister with influenza A, now has fever and flu like symptoms. Will start on Tamilfu, rx sent to [facility] on [address] in [place]. Ruba Roe MD on 2/11/2019 at 11:22 AM

## 2020-01-21 DIAGNOSIS — J11.1 INFLUENZA-LIKE ILLNESS: Primary | ICD-10-CM

## 2020-01-21 RX ORDER — OSELTAMIVIR PHOSPHATE 75 MG/1
75 CAPSULE ORAL DAILY
Qty: 7 CAPSULE | Refills: 0 | Status: SHIPPED | OUTPATIENT
Start: 2020-01-21 | End: 2020-01-28

## 2020-03-15 NOTE — TELEPHONE ENCOUNTER
Patient Information     Patient Name MRN Sex Mary Ann Huddleston [id number] Female 2000      Telephone Encounter by Ruba Roe MD at 2017 12:19 PM     Author:  Ruba Roe MD Service:  (none) Author Type:  Physician     Filed:  2017  1:00 PM Encounter Date:  2017 Status:  Signed     :  Ruba Roe MD (Physician)            Spoke with mom and Mary Ann still has lumps in her soft palate that have not resolved. Seen by dentist who did not know what they were either. Will refer to Dr. Federico MAN in [place] and plan on imaging with CT. Ruba Roe MD ....................  2017   12:23 PM          Alert and oriented, no focal deficits, no motor or sensory deficits.

## 2020-10-15 ENCOUNTER — THERAPY VISIT (OUTPATIENT)
Dept: CHIROPRACTIC MEDICINE | Facility: OTHER | Age: 20
End: 2020-10-15
Attending: CHIROPRACTOR
Payer: COMMERCIAL

## 2020-10-15 DIAGNOSIS — M99.04 SEGMENTAL AND SOMATIC DYSFUNCTION OF SACRAL REGION: ICD-10-CM

## 2020-10-15 DIAGNOSIS — M54.50 ACUTE LEFT-SIDED LOW BACK PAIN WITHOUT SCIATICA: Primary | ICD-10-CM

## 2020-10-15 PROCEDURE — 99213 OFFICE O/P EST LOW 20 MIN: CPT | Mod: 25 | Performed by: CHIROPRACTOR

## 2020-10-15 PROCEDURE — 98940 CHIROPRACT MANJ 1-2 REGIONS: CPT | Mod: AT | Performed by: CHIROPRACTOR

## 2020-10-15 NOTE — PROGRESS NOTES
"PATIENT:  Mary Ann Spring is a 19 year old female presenting for left lower back    PROBLEM:   Date of Initial Visit for this Episode:  10/15/2020    Visit #1    SUBJECTIVE / HPI: Patient presents with primary complaints of left-sided low back pain.  Symptoms began a couple of weeks ago.  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  Patient described helping move a patient which resulted in pain to the left side of her low back.  Patient initially tried to manage symptoms on her own with stretching, ice, heat, and ibuprofen.  Patient noted that this provided little relief of symptoms.  On Saturday patient described bending forward to  a T-shirt off of the ground when her back \"seized up.\"  Patient notes this was quite painful which made her think something could be out in her back which prompted her to contact her office for evaluation and treatment. Patient believes she has SI joint dysfunction.  Description and onset:  Duration and Frequency of Pain: couple of weeks and constant  Radiation of pain: no  Pain rated at it's worst: 3/10  Pain rated currently:  3/10  Pain course: Gradually improving  Worse with:  extension  Improved by:  Little relief by ice, heat, ibuprofen  Additional Features: unremarkable  Other Health Care Providers seen for this: none, patient has been under [facility] chiropractic care for neck and upper back pain  Previous treatment: home care  Previous injury:unremarkable      See flowsheets in chart for details.  10/15/2020    Oswestry (GONZALO) Questionnaire    OSWESTRY DISABILITY INDEX 10/15/2020   Count 9   Sum 5   Oswestry Score (%) 11.11   Some recent data might be hidden      Functional limitations:  Extension, lifting, recreational activities     Past D.C. Care: yes, helpful         PAST MEDICAL HISTORY:  Past Medical History:   Diagnosis Date     Encounter for initial prescription of contraceptive pills 7/16/2018     Infectious mononucleosis without " complication     12/2016     Personal history of other medical treatment (CODE)     15 months for RSV bronchiolitis.     Urinary tract infection     02/07       PAST SURGICAL HISTORY:  Past Surgical History:   Procedure Laterality Date     OTHER SURGICAL HISTORY      53606.0,PAST SURGICAL HISTORY,none       ALLERGIES:  No Known Allergies    CURRENT MEDICATIONS:  Current Outpatient Medications   Medication Sig Dispense Refill     fexofenadine (ALLEGRA) 180 MG tablet Take 1 tablet (180 mg) by mouth daily 30 tablet 1     norgestrel-ethinyl estradiol (LO/OVRAL) 0.3-30 MG-MCG per tablet Take 1 tablet by mouth daily 84 tablet 3       SOCIAL HISTORY:  Marital Status: single (never ).  Children: no.  Occupation: Student and CNA.  Alcohol use:not on file  Tobacco use: Smoker: no.      FAMILY HISTORY:  Family History   Problem Relation Age of Onset     Other - See Comments Brother         Cytomegalovirus encephalopathy       Patient Active Problem List   Diagnosis     Encounter for initial prescription of contraceptive pills         ROS:  The patient denies any fevers, chills, nausea, vomiting, diarrhea, constipation,dysuria, hematuria, or urinary hesitancy or incontinence.  No shortness of breath, chest pain, or rashes.    OBJECTIVE:    DIAGNOSTICS:  No current spinal imaging taken.     PHYSICAL EXAM:     GENERAL APPEARANCE: healthy, alert, active, no distress and cooperative   GAIT: NORMAL      MUSCULOSKELETAL:   Posture: Anterior head carriage, rounded shoulders.  Low left iliac crest,   Gait:  unremarkable.           Thoracic and Lumbar performed actively, measured approximately  ROM:  60/60 flexion 40/60 extension left SI joint pain   45/45 RLF    45/45 LLF   35/45 RR      40/45 LR    +Kemps: left SI joint   - Straight leg raise  Other:  Ely's: negative bilaterally    +Tenderness: left PSIS  +Muscle spasm: mild left quadratus lumborum, taut/tender fibers apparent left lumbar paraspinals and left gluteus  "medius  +Joint asymmetry and restriction: Sacrum P-L listing/extension restriction    ASSESSMENT: Mary Ann Spring is a 19 year old female presenting with primary complaints of left-sided low back pain.  Evidence is present of segmental somatic dysfunction of the left sacrum consistent with patient's symptoms.  Patient does appear to be a very good candidate for chiropractic care and anticipate a favorable response with very short duration of treatment.  Patient is going to college in [employer] and is home for only short period of time.  Patient may still require 1-2 additional visits.  Should symptoms flare while patient is gone follow-up care may be difficult to plan which could create lapse in care if additional visits are needed.  Patient is being provided with home exercises to help with these symptoms should they flare and is unable to follow-up for care.     1. Acute left-sided low back pain without sciatica    2. Segmental and somatic dysfunction of sacral region        PLAN    Evaluation and Management:  10346 Moderate exam established patient 20 min    Procedures:  Modalities:  None performed this visit    CMT:  17934 Chiropractic manipulative treatment 1-2 regions performed   Pelvis: Diversified, Sacrum , Side posture    Therapeutic procedures:  Resisted knee adduction, 10 second hold, 3-5 times  Clamshell with or without side plank, 10 second hold, 10 times per side  Supine knee to chest, 10 second hold, 3-5 times per side  Recommended youtube video by Kaye Amanda PT, \"Low back Tweak fixes.\"      Response to Treatment  Reduction in symptoms as reported by patient    Prognosis: Excellent    10/15/2020 Plan of Care:  2-4 visits of Chiropractic Care including Spinal Adjustments and/or physiotherapy and active rehabilitation, to include exercises in the office and/or at home to meet care plan goals.     Frequency: As needed for up to 4 weeks. A reevaluation would be clinically appropriate in 4visits, to " determine progress and further course of care.    POC discussed and patient agreeable to plan of care.      10/15/2020 Goals:      Patient will report improved lumbar pain.   Patient will report able to extend without painful limits.   Patient will demonstrate proper use of home exercises.   Patient will demonstrate an improved ability to complete Activities of Daily Living  as shown by a reported 10% reduced score on back index.    Patient will demonstrate improved  lumbarROM.        INSTRUCTIONS   ice 20 minutes every other hour as needed and perform home exercises as instructed    Follow-up:  Continue treatment PRN.        Disclaimer: This note consists of symbols derived from keyboarding, dictation and/or voice recognition software. As a result, there may be errors in the script that have gone undetected. Please consider this when interpreting information found in this chart.

## 2021-05-17 ENCOUNTER — OFFICE VISIT (OUTPATIENT)
Dept: FAMILY MEDICINE | Facility: OTHER | Age: 21
End: 2021-05-17
Attending: FAMILY MEDICINE
Payer: COMMERCIAL

## 2021-05-17 VITALS
HEART RATE: 61 BPM | HEIGHT: 63 IN | BODY MASS INDEX: 27 KG/M2 | WEIGHT: 152.4 LBS | SYSTOLIC BLOOD PRESSURE: 122 MMHG | RESPIRATION RATE: 16 BRPM | TEMPERATURE: 98.4 F | OXYGEN SATURATION: 99 % | DIASTOLIC BLOOD PRESSURE: 78 MMHG

## 2021-05-17 DIAGNOSIS — Z12.4 SCREENING FOR MALIGNANT NEOPLASM OF CERVIX: ICD-10-CM

## 2021-05-17 DIAGNOSIS — Z23 NEED FOR HPV VACCINATION: ICD-10-CM

## 2021-05-17 DIAGNOSIS — Z11.1 SCREENING EXAMINATION FOR PULMONARY TUBERCULOSIS: Primary | ICD-10-CM

## 2021-05-17 DIAGNOSIS — Z23 NEED FOR DIPHTHERIA-TETANUS-PERTUSSIS (TDAP) VACCINE: ICD-10-CM

## 2021-05-17 LAB
C TRACH DNA SPEC QL NAA+PROBE: NOT DETECTED
N GONORRHOEA DNA SPEC QL NAA+PROBE: NOT DETECTED
SPECIMEN SOURCE: NORMAL

## 2021-05-17 PROCEDURE — 88142 CYTOPATH C/V THIN LAYER: CPT | Performed by: FAMILY MEDICINE

## 2021-05-17 PROCEDURE — 87591 N.GONORRHOEAE DNA AMP PROB: CPT | Mod: ZL | Performed by: FAMILY MEDICINE

## 2021-05-17 PROCEDURE — 90651 9VHPV VACCINE 2/3 DOSE IM: CPT | Performed by: FAMILY MEDICINE

## 2021-05-17 PROCEDURE — 86481 TB AG RESPONSE T-CELL SUSP: CPT | Mod: ZL | Performed by: FAMILY MEDICINE

## 2021-05-17 PROCEDURE — 99213 OFFICE O/P EST LOW 20 MIN: CPT | Mod: 25 | Performed by: FAMILY MEDICINE

## 2021-05-17 PROCEDURE — 90472 IMMUNIZATION ADMIN EACH ADD: CPT | Performed by: FAMILY MEDICINE

## 2021-05-17 PROCEDURE — 87491 CHLMYD TRACH DNA AMP PROBE: CPT | Mod: ZL | Performed by: FAMILY MEDICINE

## 2021-05-17 PROCEDURE — 90471 IMMUNIZATION ADMIN: CPT | Performed by: FAMILY MEDICINE

## 2021-05-17 PROCEDURE — G0123 SCREEN CERV/VAG THIN LAYER: HCPCS | Performed by: FAMILY MEDICINE

## 2021-05-17 PROCEDURE — 90715 TDAP VACCINE 7 YRS/> IM: CPT | Performed by: FAMILY MEDICINE

## 2021-05-17 PROCEDURE — 36415 COLL VENOUS BLD VENIPUNCTURE: CPT | Mod: ZL | Performed by: FAMILY MEDICINE

## 2021-05-17 SDOH — HEALTH STABILITY: MENTAL HEALTH: HOW OFTEN DO YOU HAVE 6 OR MORE DRINKS ON ONE OCCASION?: NEVER

## 2021-05-17 SDOH — HEALTH STABILITY: MENTAL HEALTH: HOW OFTEN DO YOU HAVE A DRINK CONTAINING ALCOHOL?: NEVER

## 2021-05-17 SDOH — ECONOMIC STABILITY: INCOME INSECURITY: HOW HARD IS IT FOR YOU TO PAY FOR THE VERY BASICS LIKE FOOD, HOUSING, MEDICAL CARE, AND HEATING?: NOT HARD AT ALL

## 2021-05-17 SDOH — ECONOMIC STABILITY: TRANSPORTATION INSECURITY
IN THE PAST 12 MONTHS, HAS THE LACK OF TRANSPORTATION KEPT YOU FROM MEDICAL APPOINTMENTS OR FROM GETTING MEDICATIONS?: NO

## 2021-05-17 SDOH — SOCIAL STABILITY: SOCIAL NETWORK: HOW OFTEN DO YOU ATTENT MEETINGS OF THE CLUB OR ORGANIZATION YOU BELONG TO?: NOT ASKED

## 2021-05-17 SDOH — SOCIAL STABILITY: SOCIAL INSECURITY
WITHIN THE LAST YEAR, HAVE TO BEEN RAPED OR FORCED TO HAVE ANY KIND OF SEXUAL ACTIVITY BY YOUR PARTNER OR EX-PARTNER?: NO

## 2021-05-17 SDOH — SOCIAL STABILITY: SOCIAL INSECURITY: WITHIN THE LAST YEAR, HAVE YOU BEEN HUMILIATED OR EMOTIONALLY ABUSED IN OTHER WAYS BY YOUR PARTNER OR EX-PARTNER?: NO

## 2021-05-17 SDOH — SOCIAL STABILITY: SOCIAL NETWORK
DO YOU BELONG TO ANY CLUBS OR ORGANIZATIONS SUCH AS CHURCH GROUPS UNIONS, FRATERNAL OR ATHLETIC GROUPS, OR SCHOOL GROUPS?: NOT ASKED

## 2021-05-17 SDOH — SOCIAL STABILITY: SOCIAL INSECURITY
WITHIN THE LAST YEAR, HAVE YOU BEEN KICKED, HIT, SLAPPED, OR OTHERWISE PHYSICALLY HURT BY YOUR PARTNER OR EX-PARTNER?: NO

## 2021-05-17 SDOH — SOCIAL STABILITY: SOCIAL NETWORK: IN A TYPICAL WEEK, HOW MANY TIMES DO YOU TALK ON THE PHONE WITH FAMILY, FRIENDS, OR NEIGHBORS?: NOT ASKED

## 2021-05-17 SDOH — SOCIAL STABILITY: SOCIAL INSECURITY: WITHIN THE LAST YEAR, HAVE YOU BEEN AFRAID OF YOUR PARTNER OR EX-PARTNER?: NO

## 2021-05-17 SDOH — SOCIAL STABILITY: SOCIAL NETWORK: HOW OFTEN DO YOU GET TOGETHER WITH FRIENDS OR RELATIVES?: NOT ASKED

## 2021-05-17 SDOH — ECONOMIC STABILITY: FOOD INSECURITY: WITHIN THE PAST 12 MONTHS, YOU WORRIED THAT YOUR FOOD WOULD RUN OUT BEFORE YOU GOT MONEY TO BUY MORE.: NEVER TRUE

## 2021-05-17 SDOH — ECONOMIC STABILITY: TRANSPORTATION INSECURITY
IN THE PAST 12 MONTHS, HAS LACK OF TRANSPORTATION KEPT YOU FROM MEETINGS, WORK, OR FROM GETTING THINGS NEEDED FOR DAILY LIVING?: NO

## 2021-05-17 SDOH — SOCIAL STABILITY: SOCIAL NETWORK: ARE YOU MARRIED, WIDOWED, DIVORCED, SEPARATED, NEVER MARRIED, OR LIVING WITH A PARTNER?: NEVER MARRIED

## 2021-05-17 SDOH — SOCIAL STABILITY: SOCIAL NETWORK: HOW OFTEN DO YOU ATTEND CHURCH OR RELIGIOUS SERVICES?: MORE THAN 4 TIMES PER YEAR

## 2021-05-17 SDOH — ECONOMIC STABILITY: FOOD INSECURITY: WITHIN THE PAST 12 MONTHS, THE FOOD YOU BOUGHT JUST DIDN'T LAST AND YOU DIDN'T HAVE MONEY TO GET MORE.: NEVER TRUE

## 2021-05-17 SDOH — HEALTH STABILITY: MENTAL HEALTH: HOW MANY STANDARD DRINKS CONTAINING ALCOHOL DO YOU HAVE ON A TYPICAL DAY?: NOT ASKED

## 2021-05-17 ASSESSMENT — PAIN SCALES - GENERAL: PAINLEVEL: NO PAIN (0)

## 2021-05-17 ASSESSMENT — MIFFLIN-ST. JEOR: SCORE: 1420.41

## 2021-05-17 NOTE — LETTER
May 21, 2021      Mary Ann Sprign  [address] [facility] [address]        Dear ,    We are writing to inform you of your test results.    These results are normal.    Resulted Orders   GC/Chlamydia by PCR   Result Value Ref Range    Specimen Source Urine     Neisseria gonorrhoreae PCR Not Detected NDET^Not Detected      Comment:      NOT DETECTED: Negative for N.gonorrhoeae genomic DNA by [employer]   real-time,reverse-transcriptase PCR. A negative result does not preclude the   presence of N.gonorrhoeae infection. The results are dependent on proper   collection,transport,processing of the specimen,and the presence of sufficient   DNA to be detected.      Chlamydia Trachomatis PCR Not Detected NDET^Not Detected      Comment:      NOTDETECTED: Negative for C.trachomatis genomic DNA by [employer]   real-time,reverse-transcriptase PCR. A negative result does not preclude the   presence of C.trachomatis infection. The results are dependent on proper   collection,transpoet,processing of specimen, and the presence of sufficient   DNA to be detected.     Quantiferon TB Gold Plus   Result Value Ref Range    MTB Quantiferon Result Negative NEG^Negative      Comment:      No interferon gamma response to M.tuberculosis antigens was detected.   Infection with M.tuberculosis is unlikely, however a single negative result   does not exclude infection. In patients at high risk for infection, a second   test should be considered      TB1 Ag minus Nil 0.05 IU/mL    TB2 Ag minus Nil 0.09 IU/mL    Mitogen minus Nil 9.92 IU/mL    NIL Result 0.08 IU/mL   Pap Screen Thin Prep only - recommended age 21 - 24 years   Result Value Ref Range    PAP NIL     Copath Report         Patient Name: MARY ANN SPRING  MR#: [id number]  Specimen #: [id number]  Collected: 5/17/2021  Received: 5/19/2021  Reported: 5/21/2021 08:03  Ordering Phy(s): JAMES LANG    For improved result formatting, select 'View Enhanced Report  Format' under   Linked Documents section.    SPECIMEN/STAIN PROCESS:  Thin Prep Pap Screen - GICH (ThinPrep)       Pap Stain (GICH) x 1    SOURCE: Cervical, endocervical  ----------------------------------------------------------------   Thin Prep Pap Screen - GICH (ThinPrep)  SPECIMEN ADEQUACY:  Satisfactory for evaluation.  -Transformation zone component present.    CYTOLOGIC INTERPRETATION:    Negative for intraepithelial lesion or malignancy    Electronically signed out by:  ZULLY Panchal (ASCP)    Processed and screened at [facility]    CLINICAL HISTORY:  LMP: 2021  Oral Birth Control Pill, First pap test,    Papanicolaou Test Limitations:  Cervical cytology is a screening test with   limited sens itivity; regular  screening is critical for cancer prevention; Pap tests are primarily   effective for the diagnosis/prevention of  squamous cell carcinoma, not adenocarcinomas or other cancers.    TESTING LAB LOCATION:  [facility]  [address] [place] [id number] [phone]    COLLECTION SITE:  Client:  [facility]  Location: [facility] (B)           If you have any questions or concerns, please call the clinic at the number listed above.       Sincerely,      Hailey Sam MD

## 2021-05-17 NOTE — PROGRESS NOTES
"Nursing Notes:   Iwona Albright MA  5/17/2021  8:56 AM  Signed  Chief Complaint   Patient presents with     Imm/Inj     Lab Only     Gyn Exam     Patient presents for immunizations and lab work needed for school     Initial /78 (BP Location: Right arm, Patient Position: Sitting, Cuff Size: Adult Regular)   Pulse 61   Temp 98.4  F (36.9  C) (Tympanic)   Resp 16   Wt 69.1 kg (152 lb 6.4 oz)   LMP 04/26/2021   SpO2 99%   Breastfeeding No   BMI 27.00 kg/m   Estimated body mass index is 27 kg/m  as calculated from the following:    Height as of 7/16/18: 1.6 m (5' 3\").    Weight as of this encounter: 69.1 kg (152 lb 6.4 oz).  Medication Reconciliation: complete    Iwona Albright MA       SUBJECTIVE:   CC:  Mary Ann Spring is a 20 year old female who presents to clinic today for the following health issues: Immunizations and tuberculosis screening.    HPI  Mary Ann Spring is a 20 year old female who presents for immunization update and tuberculosis screening.  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      COVID vaccines completed.  Menactra up to date.  HPV #3 is due.  Boostrix done 2003  Varicella completed.  .     Patient has not had pap smear  - is sexually active, engaged.  Has been on birth control pills.     No Known Allergies  Current Outpatient Medications   Medication     fexofenadine (ALLEGRA) 180 MG tablet     No current facility-administered medications for this visit.       Past Medical History:   Diagnosis Date     Encounter for initial prescription of contraceptive pills 7/16/2018     Infectious mononucleosis without complication     12/2016     Personal history of other medical treatment (CODE)     15 months for RSV bronchiolitis.     Urinary tract infection     02/07      Past Surgical History:   Procedure Laterality Date     OTHER SURGICAL HISTORY      33759.0,PAST SURGICAL HISTORY,none       Review of Systems     PHQ-2 Score:     PHQ-2 ( 1999 " "[employer]) 5/17/2021 7/16/2018   Q1: Little interest or pleasure in doing things 0 0   Q2: Feeling down, depressed or hopeless 0 0   PHQ-2 Score 0 0         PHQ-9 SCORE 12/12/2016   PHQ-9 Total Score 0     No flowsheet data found.          OBJECTIVE:     /78 (BP Location: Right arm, Patient Position: Sitting, Cuff Size: Adult Regular)   Pulse 61   Temp 98.4  F (36.9  C) (Tympanic)   Resp 16   Ht 1.6 m (5' 3\")   Wt 69.1 kg (152 lb 6.4 oz)   LMP 04/26/2021   SpO2 99%   Breastfeeding No   BMI 27.00 kg/m    Wt Readings from Last 3 Encounters:   05/17/21 69.1 kg (152 lb 6.4 oz)   07/16/18 66.4 kg (146 lb 6.4 oz) (77 %, Z= 0.73)*   06/02/17 61.2 kg (135 lb) (67 %, Z= 0.43)*     * Growth percentiles are based on CDC (Girls, 2-20 Years) data.       Body mass index is 27 kg/m .  Physical Exam  Vitals signs and nursing note reviewed.   Constitutional:       Appearance: Normal appearance.   Genitourinary:     General: Normal vulva.      Comments: Cervix normal.  No discharge  Pap smear obtained   Neurological:      Mental Status: She is alert.   Psychiatric:         Mood and Affect: Mood normal.         Behavior: Behavior normal.         Thought Content: Thought content normal.          No results found for any visits on 05/17/21.      ASSESSMENT/PLAN:     1. Screening examination for pulmonary tuberculosis    2. Need for HPV vaccination    3. Need for diphtheria-tetanus-pertussis (Tdap) vaccine    4. Screening for malignant neoplasm of cervix        Assessment & Plan   Problem List Items Addressed This Visit     None      Visit Diagnoses     Screening examination for pulmonary tuberculosis    -  Primary    Relevant Orders    Quantiferon TB Gold Plus (Completed)    Need for HPV vaccination        Relevant Orders    GC/Chlamydia by PCR (Completed)    GH IMM-  HUMAN PAPILLOMA VIRUS (GARDASIL 9) VACCINE (Completed)    Need for diphtheria-tetanus-pertussis (Tdap) vaccine        Relevant Orders    TDAP VACCINE (Adacel, " Boostrix)  [[id number]] (Completed)    Screening for malignant neoplasm of cervix        Relevant Orders    Pap Screen Thin Prep only - recommended age 21 - 24 years          1.  quantiferon screening today  2.  Form to be completed   3.  Boostrix and HPV #3 today  4.  Pap smear obtained today.        JAMES LANG MD  [facility]

## 2021-05-17 NOTE — NURSING NOTE
"Chief Complaint   Patient presents with     Imm/Inj     Lab Only     Gyn Exam     Patient presents for immunizations and lab work needed for school     Initial /78 (BP Location: Right arm, Patient Position: Sitting, Cuff Size: Adult Regular)   Pulse 61   Temp 98.4  F (36.9  C) (Tympanic)   Resp 16   Wt 69.1 kg (152 lb 6.4 oz)   LMP 04/26/2021   SpO2 99%   Breastfeeding No   BMI 27.00 kg/m   Estimated body mass index is 27 kg/m  as calculated from the following:    Height as of 7/16/18: 1.6 m (5' 3\").    Weight as of this encounter: 69.1 kg (152 lb 6.4 oz).  Medication Reconciliation: complete    Iwona Albright MA     Immunization Documentation    Prior to Immunization administration, verified patients identity using patient's name and date of birth. Please see IMMUNIZATIONS  and order for additional information.  Patient / Parent instructed to remain in clinic for 15 minutes and report any adverse reaction to staff immediately.    Was the entire amount of vaccines given used? Yes    Iwona Albright MA  5/17/2021   10:18 AM    "

## 2021-05-19 LAB
GAMMA INTERFERON BACKGROUND BLD IA-ACNC: 0.08 IU/ML
M TB IFN-G CD4+ BCKGRND COR BLD-ACNC: 9.92 IU/ML
M TB TUBERC IFN-G BLD QL: NEGATIVE
MITOGEN IGNF BCKGRD COR BLD-ACNC: 0.05 IU/ML
MITOGEN IGNF BCKGRD COR BLD-ACNC: 0.09 IU/ML

## 2021-05-21 LAB
COPATH REPORT: NORMAL
PAP: NORMAL

## 2022-04-13 ENCOUNTER — MYC MEDICAL ADVICE (OUTPATIENT)
Dept: FAMILY MEDICINE | Facility: OTHER | Age: 22
End: 2022-04-13
Payer: COMMERCIAL

## 2022-05-06 ENCOUNTER — OFFICE VISIT (OUTPATIENT)
Dept: FAMILY MEDICINE | Facility: OTHER | Age: 22
End: 2022-05-06
Attending: FAMILY MEDICINE
Payer: COMMERCIAL

## 2022-05-06 VITALS
SYSTOLIC BLOOD PRESSURE: 134 MMHG | OXYGEN SATURATION: 100 % | RESPIRATION RATE: 16 BRPM | HEIGHT: 63 IN | DIASTOLIC BLOOD PRESSURE: 82 MMHG | TEMPERATURE: 98.1 F | HEART RATE: 89 BPM | WEIGHT: 150.8 LBS | BODY MASS INDEX: 26.72 KG/M2

## 2022-05-06 DIAGNOSIS — Z30.430 ENCOUNTER FOR IUD INSERTION: Primary | ICD-10-CM

## 2022-05-06 LAB — HCG UR QL: NEGATIVE

## 2022-05-06 PROCEDURE — 81025 URINE PREGNANCY TEST: CPT | Mod: ZL | Performed by: FAMILY MEDICINE

## 2022-05-06 PROCEDURE — 58300 INSERT INTRAUTERINE DEVICE: CPT | Performed by: FAMILY MEDICINE

## 2022-05-06 PROCEDURE — 250N000011 HC RX IP 250 OP 636: Performed by: FAMILY MEDICINE

## 2022-05-06 RX ADMIN — LEVONORGESTREL 20 MCG: 52 INTRAUTERINE DEVICE INTRAUTERINE at 15:27

## 2022-05-06 ASSESSMENT — PAIN SCALES - GENERAL: PAINLEVEL: NO PAIN (0)

## 2022-05-06 NOTE — NURSING NOTE
"Chief Complaint   Patient presents with     IUD     Placement      Patient is here for IUD placement     Initial /82   Pulse 89   Temp 98.1  F (36.7  C) (Tympanic)   Resp 16   Ht 1.594 m (5' 2.75\")   Wt 68.4 kg (150 lb 12.8 oz)   LMP 04/07/2022   SpO2 100%   Breastfeeding No   BMI 26.93 kg/m   Estimated body mass index is 26.93 kg/m  as calculated from the following:    Height as of this encounter: 1.594 m (5' 2.75\").    Weight as of this encounter: 68.4 kg (150 lb 12.8 oz).  Medication Reconciliation: complete    Iwona Albright MA       FOOD SECURITY SCREENING QUESTIONS:    The next two questions are to help us understand your food security.  If you are feeling you need any assistance in this area, we have resources available to support you today.    Hunger Vital Signs:  Within the past 12 months we worried whether our food would run out before we got money to buy more. Never  Within the past 12 months the food we bought just didn't last and we didn't have money to get more. Never  Iwona Albright MA,LPN on 5/6/2022 at 2:36 PM      "

## 2022-05-06 NOTE — PROGRESS NOTES
"  Assessment & Plan       ICD-10-CM    1. Encounter for IUD insertion  Z30.430 Pregnancy, Urine (HCG)     levonorgestrel (MIRENA) 20 MCG/DAY IUD 20 mcg     HC INSERTION OF IUD FOR THERAPEUTIC PURPOSES       1.  See follow up instructions below.             BMI:   Estimated body mass index is 26.93 kg/m  as calculated from the following:    Height as of this encounter: 1.594 m (5' 2.75\").    Weight as of this encounter: 68.4 kg (150 lb 12.8 oz).       Pap smear due age 22.    JAMES LANG MD  [facility]   Mary Ann is a 21 year old who presents for the following health issues: IUD insertion     IUD    History of Present Illness       Reason for visit:  Birth control  Symptoms include:  None    She eats 2-3 servings of fruits and vegetables daily.She consumes 0 sweetened beverage(s) daily.She exercises with enough effort to increase her heart rate 30 to 60 minutes per day.  She exercises with enough effort to increase her heart rate 4 days per week.   She is taking medications regularly.         Current Outpatient Medications   Medication     fexofenadine (ALLEGRA) 180 MG tablet     No current facility-administered medications for this visit.      No Known Allergies      Review of Systems   Negative       Objective    /82   Pulse 89   Temp 98.1  F (36.7  C) (Tympanic)   Resp 16   Ht 1.594 m (5' 2.75\")   Wt 68.4 kg (150 lb 12.8 oz)   LMP 04/07/2022   SpO2 100%   Breastfeeding No   BMI 26.93 kg/m    Body mass index is 26.93 kg/m .  Physical Exam     IUD INSERTIONPROCEDURE NOTE      The alternatives, risks, benefits and potential complications have beenreviewed with the patient and all questions answered.   .  Results for orders placed or performed in visit on 05/06/22   Pregnancy, Urine (HCG)     Status: Normal   Result Value Ref Range    hCG Urine Qualitative Negative Negative       Uterus is of mid position.  After a  betadine prep and under sterile " conditions, a tenaculum was placed on the cervix and the uterus was sounded to 7.5 cm.  A mirena IUD was inserted - the first IUD attempted would not pass, but the second was inserted without difficulty and the string cut to 2.5 cm.  The patient tolerated the procedure well.      The patient was instructed to confirm presence of stringafter each menses.  She has been instructed to return to the office for abnormal bleeding, pain, infection or suspected expulsion of device occurs.          Results for orders placed or performed in visit on 05/06/22   Pregnancy, Urine (HCG)     Status: Normal   Result Value Ref Range    hCG Urine Qualitative Negative Negative

## 2022-05-21 ENCOUNTER — HEALTH MAINTENANCE LETTER (OUTPATIENT)
Age: 22
End: 2022-05-21

## 2022-09-17 ENCOUNTER — HEALTH MAINTENANCE LETTER (OUTPATIENT)
Age: 22
End: 2022-09-17

## 2023-02-06 ENCOUNTER — MYC MEDICAL ADVICE (OUTPATIENT)
Dept: FAMILY MEDICINE | Facility: OTHER | Age: 23
End: 2023-02-06
Payer: COMMERCIAL

## 2023-02-06 DIAGNOSIS — F51.05 INSOMNIA SECONDARY TO ANXIETY: Primary | ICD-10-CM

## 2023-02-06 DIAGNOSIS — F41.9 INSOMNIA SECONDARY TO ANXIETY: Primary | ICD-10-CM

## 2023-02-07 RX ORDER — ESCITALOPRAM OXALATE 10 MG/1
5 TABLET ORAL DAILY
Qty: 15 TABLET | Refills: 0 | Status: SHIPPED | OUTPATIENT
Start: 2023-02-07 | End: 2023-03-08

## 2023-03-07 DIAGNOSIS — F51.05 INSOMNIA SECONDARY TO ANXIETY: ICD-10-CM

## 2023-03-07 DIAGNOSIS — F41.9 INSOMNIA SECONDARY TO ANXIETY: ICD-10-CM

## 2023-03-08 ENCOUNTER — MYC REFILL (OUTPATIENT)
Dept: FAMILY MEDICINE | Facility: OTHER | Age: 23
End: 2023-03-08
Payer: COMMERCIAL

## 2023-03-08 DIAGNOSIS — F41.9 INSOMNIA SECONDARY TO ANXIETY: ICD-10-CM

## 2023-03-08 DIAGNOSIS — F51.05 INSOMNIA SECONDARY TO ANXIETY: ICD-10-CM

## 2023-03-08 RX ORDER — ESCITALOPRAM OXALATE 10 MG/1
5 TABLET ORAL DAILY
Qty: 15 TABLET | Refills: 0 | Status: CANCELLED | OUTPATIENT
Start: 2023-03-08

## 2023-03-08 RX ORDER — ESCITALOPRAM OXALATE 10 MG/1
TABLET ORAL
Qty: 15 TABLET | Refills: 0 | Status: SHIPPED | OUTPATIENT
Start: 2023-03-08 | End: 2023-03-24

## 2023-03-08 NOTE — TELEPHONE ENCOUNTER
Pending Prescriptions:                       Disp   Refills    escitalopram (LEXAPRO) 10 MG tablet [Phar*15 tab*0            Sig: TAKE 1/2 TABLET(5 MG) BY MOUTH DAILY    Last OV- 5/6/22 for IUD insertion    Last filled- 2/7/23 for 15 tablets with no refills     Next OV- 3/24/23 video visit for medication check     Iwona Albright CMA on 3/8/2023 at 1:29 PM

## 2023-03-08 NOTE — TELEPHONE ENCOUNTER
This is a duplicate request. Refill request received from pharmacy yesterday.     Iwona Albright CMA on 3/8/2023 at 1:26 PM

## 2023-03-24 ENCOUNTER — VIRTUAL VISIT (OUTPATIENT)
Dept: FAMILY MEDICINE | Facility: OTHER | Age: 23
End: 2023-03-24
Attending: FAMILY MEDICINE
Payer: COMMERCIAL

## 2023-03-24 DIAGNOSIS — F43.10 PTSD (POST-TRAUMATIC STRESS DISORDER): ICD-10-CM

## 2023-03-24 DIAGNOSIS — F51.05 INSOMNIA SECONDARY TO ANXIETY: Primary | ICD-10-CM

## 2023-03-24 DIAGNOSIS — F41.9 INSOMNIA SECONDARY TO ANXIETY: Primary | ICD-10-CM

## 2023-03-24 PROCEDURE — 99214 OFFICE O/P EST MOD 30 MIN: CPT | Mod: VID | Performed by: FAMILY MEDICINE

## 2023-03-24 RX ORDER — TERAZOSIN 1 MG/1
1 CAPSULE ORAL AT BEDTIME
Qty: 30 CAPSULE | Refills: 3 | Status: SHIPPED | OUTPATIENT
Start: 2023-03-24 | End: 2023-06-09

## 2023-03-24 RX ORDER — VENLAFAXINE HYDROCHLORIDE 37.5 MG/1
37.5 CAPSULE, EXTENDED RELEASE ORAL DAILY
Qty: 30 CAPSULE | Refills: 3 | Status: SHIPPED | OUTPATIENT
Start: 2023-03-24 | End: 2023-06-09

## 2023-03-24 RX ORDER — ESCITALOPRAM OXALATE 10 MG/1
5 TABLET ORAL DAILY
Qty: 90 TABLET | Refills: 3 | Status: CANCELLED | OUTPATIENT
Start: 2023-03-24

## 2023-03-24 NOTE — LETTER
March 24, 2023      Mary Ann Spring  [address] [address]        To Whom It May Concern,     This letter is on behalf of Mary Ann Spring.  Ms. Spring is currently being treated for anxiety, insomnia and PTSD.  Her condition would benefit from having a  animal.        Sincerely,        JAMES LANG MD

## 2023-03-24 NOTE — PROGRESS NOTES
"Mary Ann is a 22 year old who is being evaluated via a billable video visit.      How would you like to obtain your AVS? Joselin  If the video visit is dropped, the invitation should be resent by: Joselin  Will anyone else be joining your video visit? No        Assessment & Plan       ICD-10-CM    1. Insomnia secondary to anxiety  F41.9 venlafaxine (EFFEXOR XR) 37.5 MG 24 hr capsule    F51.05 terazosin (HYTRIN) 1 MG capsule      2. PTSD (post-traumatic stress disorder)  F43.10 terazosin (HYTRIN) 1 MG capsule        1.  22-year-old female with insomnia due to anxiety.  She does also meet criteria for PTSD with nightmares, impending doom, physical reactivity, and exposure.  Lexapro has been somewhat helpful, but she is having sexual side effects from this.  We will change her to Effexor extended release 37.5 mg daily.  Potential side effects of this medication are discussed.  I would also recommend at this time that she start terazosin 1 mg at bedtime to help with nightmares/dreams.  Brief discussion regarding EMDR and other therapies that can be helpful.  2.  Specific discussion today regarding  animal.  I do feel that this patient would benefit from having a animal to help with physical and emotional symptoms.  I would also anticipate this would likely lead to the patient being able to come off of medication.    Letter completed today.     Prescription drug management  35 minutes spent on the date of the encounter doing patient visit, documentation and letter composition.          BMI:   Estimated body mass index is 26.93 kg/m  as calculated from the following:    Height as of 5/6/22: 1.594 m (5' 2.75\").    Weight as of 5/6/22: 68.4 kg (150 lb 12.8 oz).           Return in about 4 weeks (around 4/21/2023).    JAMES LANG MD  [facility]    Subjective   Mary Ann is a 22 year old, presenting for the following health issues:  Recheck Medication    Additional Questions " "3/24/2023   Roomed by DAVID Bustillo   Accompanied by Video vist     HPI   Mary Ann Spring is a 22 year old female presents for video follow up of anxiety symptoms.  She had contact the clinic a few weeks ago regarding insomnia and anxiety symptoms.  She was started on Lexapro 5 mg a day.  She says overall, she is tolerating this medication but is having libido/sexual side effects from it.  She states when her brother  years ago, she started to have anxiety symptoms.  At that time it was difficulty with sleeping, having dreams/nightmares that something bad was going to happen to a family member, having a general sense of impending doom.  She said this improved with the combination of getting a new family dog/pet and time.  This past October, that dog passed away.  This was near the anniversary time of her brother's death.  She thought the symptoms would get better over time, but they did not.  She continued to have worsening insomnia, problems falling asleep and staying asleep.  She would have sense of impending doom, worries about something bad happening to a family member.  She was having nightmares/intrusive dreams.  This started to significantly affect her schoolwork.    Tried - fixing sleep schedule, benadryl, cutting out alcohol, improving exercise schedule.      The medication helps with shutting her brain off, but not the dreams.  Continues to have anxiety - impending doom.    Current Outpatient Medications   Medication     fexofenadine (ALLEGRA) 180 MG tablet     terazosin (HYTRIN) 1 MG capsule     venlafaxine (EFFEXOR XR) 37.5 MG 24 hr capsule     No current facility-administered medications for this visit.             Review of Systems         Objective    Vitals - Patient Reported  Weight (Patient Reported): 61.2 kg (135 lb)  Height (Patient Reported): 159.4 cm (5' 2.75\")  BMI (Based on Pt Reported Ht/Wt): 24.1  Pain Score: No Pain (0)        Physical Exam   GENERAL: Healthy, alert and no " distress  EYES: Eyes grossly normal to inspection.  No discharge or erythema, or obvious scleral/conjunctival abnormalities.  RESP: No audible wheeze, cough, or visible cyanosis.  No visible retractions or increased work of breathing.    SKIN: Visible skin clear. No significant rash, abnormal pigmentation or lesions.  NEURO: Cranial nerves grossly intact.  Mentation and speech appropriate for age.  PSYCH: Mentation appears normal, affect normal/bright, judgement and insight intact, normal speech and appearance well-groomed.                Video-Visit Details    Type of service:  Video Visit     Originating Location (pt. Location): Home  Distant Location (provider location):  On-site  Platform used for Video Visit: Daryl

## 2023-03-24 NOTE — NURSING NOTE
Chief Complaint   Patient presents with     Recheck Medication     Patient rechecking medications     Medication Reconciliation: complete    Iwona Albright CMA       FOOD SECURITY SCREENING QUESTIONS:    The next two questions are to help us understand your food security.  If you are feeling you need any assistance in this area, we have resources available to support you today.    Hunger Vital Signs:  Within the past 12 months we worried whether our food would run out before we got money to buy more. Never  Within the past 12 months the food we bought just didn't last and we didn't have money to get more. Never  Iwona Albright CMA,LPN on 3/24/2023 at 1:24 PM

## 2023-06-04 ENCOUNTER — HEALTH MAINTENANCE LETTER (OUTPATIENT)
Age: 23
End: 2023-06-04

## 2023-06-09 ENCOUNTER — VIRTUAL VISIT (OUTPATIENT)
Dept: FAMILY MEDICINE | Facility: OTHER | Age: 23
End: 2023-06-09
Attending: FAMILY MEDICINE
Payer: COMMERCIAL

## 2023-06-09 DIAGNOSIS — F43.10 PTSD (POST-TRAUMATIC STRESS DISORDER): ICD-10-CM

## 2023-06-09 DIAGNOSIS — F51.05 INSOMNIA SECONDARY TO ANXIETY: ICD-10-CM

## 2023-06-09 DIAGNOSIS — F41.9 INSOMNIA SECONDARY TO ANXIETY: ICD-10-CM

## 2023-06-09 PROCEDURE — 99213 OFFICE O/P EST LOW 20 MIN: CPT | Mod: VID | Performed by: FAMILY MEDICINE

## 2023-06-09 RX ORDER — VENLAFAXINE HYDROCHLORIDE 37.5 MG/1
37.5 CAPSULE, EXTENDED RELEASE ORAL DAILY
Qty: 90 CAPSULE | Refills: 3 | Status: SHIPPED | OUTPATIENT
Start: 2023-06-09 | End: 2024-07-25

## 2023-06-09 RX ORDER — TERAZOSIN 1 MG/1
1 CAPSULE ORAL AT BEDTIME
Qty: 90 CAPSULE | Refills: 3 | Status: SHIPPED | OUTPATIENT
Start: 2023-06-09 | End: 2023-06-09

## 2023-06-09 NOTE — PROGRESS NOTES
Mary Ann is a 22 year old who is being evaluated via a billable video visit.      How would you like to obtain your AVS? [employer]  If the video visit is dropped, the invitation should be resent by: Other e-mail: [employer]   Will anyone else be joining your video visit? No        Assessment & Plan       ICD-10-CM    1. Insomnia secondary to anxiety  F41.9 venlafaxine (EFFEXOR XR) 37.5 MG 24 hr capsule    F51.05 DISCONTINUED: terazosin (HYTRIN) 1 MG capsule      2. PTSD (post-traumatic stress disorder)  F43.10 DISCONTINUED: terazosin (HYTRIN) 1 MG capsule        1.  Will discontinue terazosin - no longer needed  2.  Continue same dose of Effexor  3.  Patient has had good response with having CURTIS.     Prescription drug management  20 minutes spent by me on the date of the encounter doing chart review, patient visit and documentation          Return in about 5 months (around 11/9/2023).    JAMES LANG MD  [facility]    Subjective   Mary Ann is a 22 year old, presenting for the following health issues:  Recheck Medication        6/9/2023     3:40 PM   Additional Questions   Roomed by DAVID Bustillo   Accompanied by Self     HPI  Mary Ann Spring is a 22 year old female presents for video visit follow up of anxiety and PTSD. Treatment at last visit was Effexor, terazosin and CURTIS.    She is now sleeping regularly without awakening.  Exercising regularly.  Intrusive thoughts resolved.  Nightmares/dreams ceased.  CURTIS - Richard.      Current Outpatient Medications   Medication     fexofenadine (ALLEGRA) 180 MG tablet     venlafaxine (EFFEXOR XR) 37.5 MG 24 hr capsule     No current facility-administered medications for this visit.             Review of Systems         Objective             Physical Exam   GENERAL: Healthy, alert and no distress  EYES: Eyes grossly normal to inspection.  No discharge or erythema, or obvious scleral/conjunctival abnormalities.  RESP: No audible wheeze, cough, or  visible cyanosis.  No visible retractions or increased work of breathing.    SKIN: Visible skin clear. No significant rash, abnormal pigmentation or lesions.  NEURO: Cranial nerves grossly intact.  Mentation and speech appropriate for age.  PSYCH: Mentation appears normal, affect normal/bright, judgement and insight intact, normal speech and appearance well-groomed.            12/12/2016     3:00 PM   PHQ   PHQ-9 Total Score 0   Q9: Thoughts of better off dead/self-harm past 2 weeks Not at all         2/6/2023    12:55 PM   STEPHANE-7 SCORE   Total Score 3 (minimal anxiety)   Total Score 3               Video-Visit Details    Type of service:  Video Visit     Originating Location (pt. Location): Home  Distant Location (provider location):  On-site  Platform used for Video Visit: Daryl

## 2023-06-09 NOTE — NURSING NOTE
"Chief Complaint   Patient presents with     Recheck Medication     Patient following up on medications     Initial LMP 06/04/2023 (Approximate)   Breastfeeding No  Estimated body mass index is 26.93 kg/m  as calculated from the following:    Height as of 5/6/22: 1.594 m (5' 2.75\").    Weight as of 5/6/22: 68.4 kg (150 lb 12.8 oz).  Medication Reconciliation: complete    Iwona Albright CMA       FOOD SECURITY SCREENING QUESTIONS:    The next two questions are to help us understand your food security.  If you are feeling you need any assistance in this area, we have resources available to support you today.    Hunger Vital Signs:  Within the past 12 months we worried whether our food would run out before we got money to buy more. Never  Within the past 12 months the food we bought just didn't last and we didn't have money to get more. Never  Iwona Albright CMA,LPN on 6/9/2023 at 3:43 PM      "

## 2023-10-09 ENCOUNTER — MYC MEDICAL ADVICE (OUTPATIENT)
Dept: FAMILY MEDICINE | Facility: OTHER | Age: 23
End: 2023-10-09
Payer: COMMERCIAL

## 2023-10-09 DIAGNOSIS — F81.9 LEARNING DISTURBANCE: Primary | ICD-10-CM

## 2024-07-13 ENCOUNTER — HEALTH MAINTENANCE LETTER (OUTPATIENT)
Age: 24
End: 2024-07-13

## 2024-07-25 DIAGNOSIS — F51.05 INSOMNIA SECONDARY TO ANXIETY: ICD-10-CM

## 2024-07-25 DIAGNOSIS — F41.9 INSOMNIA SECONDARY TO ANXIETY: ICD-10-CM

## 2024-07-25 RX ORDER — VENLAFAXINE HYDROCHLORIDE 37.5 MG/1
37.5 CAPSULE, EXTENDED RELEASE ORAL DAILY
Qty: 90 CAPSULE | Refills: 3 | Status: SHIPPED | OUTPATIENT
Start: 2024-07-25

## 2024-07-25 NOTE — TELEPHONE ENCOUNTER
[facility] sent Rx request for the following:      Requested Prescriptions   Pending Prescriptions Disp Refills    venlafaxine (EFFEXOR XR) 37.5 MG 24 hr capsule [Pharmacy Med Name: VENLAFAXINE ER 37.5MG CAPSULES] 90 capsule 3     Sig: TAKE 1 CAPSULE(37.5 MG) BY MOUTH DAILY       Serotonin-Norepinephrine Reuptake Inhibitors  Failed - 7/25/2024  5:29 AM        Failed - Blood pressure under 140/90 in past 12 months     BP Readings from Last 3 Encounters:   05/06/22 134/82   05/17/21 122/78   07/16/18 102/70       No data recorded            Failed - STEPHANE-7 score of less than 5 in past 6 months.     Please review last STEPHANE-7 score.           Failed - Recent (12 mo) or future (90 days) visit within the authorizing provider's specialty     The patient must have completed an in-person or virtual visit within the past 12 months or has a future visit scheduled within the next 90 days with the authorizing provider s specialty.  Urgent care and e-visits do not quality as an office visit for this protocol.            Last Prescription Date:   6/9/23  Last Fill Qty/Refills:         90, R-3    Last Office Visit:              6/9/23   Future Office visit:           none    Unable to complete prescription refill per RN Medication Refill Policy.   Routing to provider to review and respond.  Nora Bland RN on 7/25/2024 at 3:36 PM

## 2025-07-19 ENCOUNTER — HEALTH MAINTENANCE LETTER (OUTPATIENT)
Age: 25
End: 2025-07-19